# Patient Record
Sex: FEMALE | Race: WHITE | Employment: UNEMPLOYED | URBAN - METROPOLITAN AREA
[De-identification: names, ages, dates, MRNs, and addresses within clinical notes are randomized per-mention and may not be internally consistent; named-entity substitution may affect disease eponyms.]

---

## 2022-09-18 ENCOUNTER — HOSPITAL ENCOUNTER (EMERGENCY)
Age: 32
Discharge: HOME | End: 2022-09-19
Attending: EMERGENCY MEDICINE
Payer: MEDICAID

## 2022-09-18 DIAGNOSIS — F19.94 SUBSTANCE INDUCED MOOD DISORDER (HCC): Primary | ICD-10-CM

## 2022-09-18 DIAGNOSIS — F10.929 ACUTE ALCOHOLIC INTOXICATION WITH COMPLICATION (HCC): ICD-10-CM

## 2022-09-18 LAB
AMPHETAMINE SCREEN, URINE: NORMAL
BACTERIA: NORMAL /HPF
BARBITURATE SCREEN URINE: NORMAL
BENZODIAZEPINE SCREEN, URINE: NORMAL
BILIRUBIN URINE: NEGATIVE
BLOOD, URINE: NEGATIVE
CANNABINOID SCREEN URINE: NORMAL
CLARITY: CLEAR
COCAINE METABOLITE SCREEN URINE: NORMAL
COLOR: YELLOW
EPITHELIAL CELLS, UA: 1 /HPF (ref 0–5)
FENTANYL SCREEN, URINE: NORMAL
GLUCOSE URINE: NEGATIVE MG/DL
HYALINE CASTS: 0 /LPF (ref 0–8)
KETONES, URINE: NEGATIVE MG/DL
LEUKOCYTE ESTERASE, URINE: ABNORMAL
Lab: NORMAL
METHADONE SCREEN, URINE: NORMAL
MICROSCOPIC EXAMINATION: YES
NITRITE, URINE: NEGATIVE
OPIATE SCREEN URINE: NORMAL
OXYCODONE URINE: NORMAL
PH UA: 5.5
PH UA: 5.5 (ref 5–8)
PHENCYCLIDINE SCREEN URINE: NORMAL
PROTEIN UA: NEGATIVE MG/DL
RBC UA: 0 /HPF (ref 0–4)
SPECIFIC GRAVITY UA: 1.01 (ref 1–1.03)
URINE REFLEX TO CULTURE: ABNORMAL
URINE TYPE: ABNORMAL
UROBILINOGEN, URINE: 0.2 E.U./DL
WBC UA: 3 /HPF (ref 0–5)

## 2022-09-18 PROCEDURE — 84703 CHORIONIC GONADOTROPIN ASSAY: CPT

## 2022-09-18 PROCEDURE — 82077 ASSAY SPEC XCP UR&BREATH IA: CPT

## 2022-09-18 PROCEDURE — 36415 COLL VENOUS BLD VENIPUNCTURE: CPT

## 2022-09-18 PROCEDURE — 80179 DRUG ASSAY SALICYLATE: CPT

## 2022-09-18 PROCEDURE — 80307 DRUG TEST PRSMV CHEM ANLYZR: CPT

## 2022-09-18 PROCEDURE — 6370000000 HC RX 637 (ALT 250 FOR IP)

## 2022-09-18 PROCEDURE — 80053 COMPREHEN METABOLIC PANEL: CPT

## 2022-09-18 PROCEDURE — 99285 EMERGENCY DEPT VISIT HI MDM: CPT

## 2022-09-18 PROCEDURE — 81001 URINALYSIS AUTO W/SCOPE: CPT

## 2022-09-18 PROCEDURE — 80143 DRUG ASSAY ACETAMINOPHEN: CPT

## 2022-09-18 RX ORDER — NICOTINE 21 MG/24HR
PATCH, TRANSDERMAL 24 HOURS TRANSDERMAL
Status: DISCONTINUED
Start: 2022-09-18 | End: 2022-09-19 | Stop reason: HOSPADM

## 2022-09-18 RX ORDER — NICOTINE 21 MG/24HR
1 PATCH, TRANSDERMAL 24 HOURS TRANSDERMAL DAILY
Status: DISCONTINUED | OUTPATIENT
Start: 2022-09-19 | End: 2022-09-19 | Stop reason: HOSPADM

## 2022-09-18 ASSESSMENT — LIFESTYLE VARIABLES
HOW OFTEN DO YOU HAVE A DRINK CONTAINING ALCOHOL: 2-3 TIMES A WEEK
HOW MANY STANDARD DRINKS CONTAINING ALCOHOL DO YOU HAVE ON A TYPICAL DAY: 3 OR 4

## 2022-09-18 ASSESSMENT — ENCOUNTER SYMPTOMS
DIARRHEA: 0
VOMITING: 0
SHORTNESS OF BREATH: 0
CHEST TIGHTNESS: 0
ABDOMINAL PAIN: 0
NAUSEA: 0

## 2022-09-18 ASSESSMENT — PAIN - FUNCTIONAL ASSESSMENT: PAIN_FUNCTIONAL_ASSESSMENT: NONE - DENIES PAIN

## 2022-09-19 VITALS
BODY MASS INDEX: 23.05 KG/M2 | DIASTOLIC BLOOD PRESSURE: 78 MMHG | HEART RATE: 78 BPM | HEIGHT: 64 IN | RESPIRATION RATE: 18 BRPM | TEMPERATURE: 98.6 F | WEIGHT: 135 LBS | SYSTOLIC BLOOD PRESSURE: 118 MMHG | OXYGEN SATURATION: 98 %

## 2022-09-19 LAB
A/G RATIO: 1.3 (ref 1.1–2.2)
ACETAMINOPHEN LEVEL: <5 UG/ML (ref 10–30)
ALBUMIN SERPL-MCNC: 4.1 G/DL (ref 3.4–5)
ALP BLD-CCNC: 121 U/L (ref 40–129)
ALT SERPL-CCNC: 220 U/L (ref 10–40)
ANION GAP SERPL CALCULATED.3IONS-SCNC: 14 MMOL/L (ref 3–16)
AST SERPL-CCNC: 214 U/L (ref 15–37)
BASOPHILS ABSOLUTE: 0 K/UL (ref 0–0.2)
BASOPHILS RELATIVE PERCENT: 0.4 %
BILIRUB SERPL-MCNC: 0.4 MG/DL (ref 0–1)
BUN BLDV-MCNC: 12 MG/DL (ref 7–20)
CALCIUM SERPL-MCNC: 8.3 MG/DL (ref 8.3–10.6)
CHLORIDE BLD-SCNC: 110 MMOL/L (ref 99–110)
CO2: 18 MMOL/L (ref 21–32)
CREAT SERPL-MCNC: 0.6 MG/DL (ref 0.6–1.1)
EOSINOPHILS ABSOLUTE: 0.1 K/UL (ref 0–0.6)
EOSINOPHILS RELATIVE PERCENT: 2 %
ETHANOL: 233 MG/DL (ref 0–0.08)
ETHANOL: 60 MG/DL (ref 0–0.08)
GFR AFRICAN AMERICAN: >60
GFR NON-AFRICAN AMERICAN: >60
GLUCOSE BLD-MCNC: 95 MG/DL (ref 70–99)
HCG QUALITATIVE: NEGATIVE
HCT VFR BLD CALC: 42.6 % (ref 36–48)
HEMOGLOBIN: 13.6 G/DL (ref 12–16)
LYMPHOCYTES ABSOLUTE: 1.4 K/UL (ref 1–5.1)
LYMPHOCYTES RELATIVE PERCENT: 23.4 %
MCH RBC QN AUTO: 32.6 PG (ref 26–34)
MCHC RBC AUTO-ENTMCNC: 31.8 G/DL (ref 31–36)
MCV RBC AUTO: 102.5 FL (ref 80–100)
MONOCYTES ABSOLUTE: 0.4 K/UL (ref 0–1.3)
MONOCYTES RELATIVE PERCENT: 6.9 %
NEUTROPHILS ABSOLUTE: 4.1 K/UL (ref 1.7–7.7)
NEUTROPHILS RELATIVE PERCENT: 67.3 %
PDW BLD-RTO: 15.8 % (ref 12.4–15.4)
PLATELET # BLD: 89 K/UL (ref 135–450)
PLATELET SLIDE REVIEW: ABNORMAL
PMV BLD AUTO: 9.1 FL (ref 5–10.5)
POTASSIUM SERPL-SCNC: 4.1 MMOL/L (ref 3.5–5.1)
RBC # BLD: 4.16 M/UL (ref 4–5.2)
REASON FOR REJECTION: NORMAL
REJECTED TEST: NORMAL
SALICYLATE, SERUM: <0.3 MG/DL (ref 15–30)
SARS-COV-2, NAAT: NOT DETECTED
SLIDE REVIEW: ABNORMAL
SODIUM BLD-SCNC: 142 MMOL/L (ref 136–145)
TOTAL PROTEIN: 7.3 G/DL (ref 6.4–8.2)
WBC # BLD: 6.1 K/UL (ref 4–11)

## 2022-09-19 PROCEDURE — 87635 SARS-COV-2 COVID-19 AMP PRB: CPT

## 2022-09-19 PROCEDURE — 96372 THER/PROPH/DIAG INJ SC/IM: CPT

## 2022-09-19 PROCEDURE — 6360000002 HC RX W HCPCS: Performed by: EMERGENCY MEDICINE

## 2022-09-19 PROCEDURE — 6360000002 HC RX W HCPCS

## 2022-09-19 PROCEDURE — 82077 ASSAY SPEC XCP UR&BREATH IA: CPT

## 2022-09-19 PROCEDURE — 85025 COMPLETE CBC W/AUTO DIFF WBC: CPT

## 2022-09-19 PROCEDURE — 36415 COLL VENOUS BLD VENIPUNCTURE: CPT

## 2022-09-19 RX ORDER — MIDAZOLAM HYDROCHLORIDE 2 MG/2ML
2 INJECTION, SOLUTION INTRAMUSCULAR; INTRAVENOUS ONCE
Status: COMPLETED | OUTPATIENT
Start: 2022-09-19 | End: 2022-09-19

## 2022-09-19 RX ORDER — MIDAZOLAM HYDROCHLORIDE 1 MG/ML
INJECTION INTRAMUSCULAR; INTRAVENOUS
Status: COMPLETED
Start: 2022-09-19 | End: 2022-09-19

## 2022-09-19 RX ORDER — HALOPERIDOL 5 MG/ML
5 INJECTION INTRAMUSCULAR ONCE
Status: COMPLETED | OUTPATIENT
Start: 2022-09-19 | End: 2022-09-19

## 2022-09-19 RX ORDER — HALOPERIDOL 5 MG/ML
INJECTION INTRAMUSCULAR
Status: COMPLETED
Start: 2022-09-19 | End: 2022-09-19

## 2022-09-19 RX ORDER — DIPHENHYDRAMINE HYDROCHLORIDE 50 MG/ML
50 INJECTION INTRAMUSCULAR; INTRAVENOUS ONCE
Status: COMPLETED | OUTPATIENT
Start: 2022-09-19 | End: 2022-09-19

## 2022-09-19 RX ADMIN — MIDAZOLAM HYDROCHLORIDE 2 MG: 2 INJECTION, SOLUTION INTRAMUSCULAR; INTRAVENOUS at 00:27

## 2022-09-19 RX ADMIN — HALOPERIDOL LACTATE 5 MG: 5 INJECTION INTRAMUSCULAR at 00:26

## 2022-09-19 RX ADMIN — HALOPERIDOL 5 MG: 5 INJECTION INTRAMUSCULAR at 00:26

## 2022-09-19 RX ADMIN — MIDAZOLAM 2 MG: 1 INJECTION INTRAMUSCULAR; INTRAVENOUS at 00:27

## 2022-09-19 RX ADMIN — DIPHENHYDRAMINE HYDROCHLORIDE 50 MG: 50 INJECTION, SOLUTION INTRAMUSCULAR; INTRAVENOUS at 00:26

## 2022-09-19 ASSESSMENT — PAIN - FUNCTIONAL ASSESSMENT: PAIN_FUNCTIONAL_ASSESSMENT: NONE - DENIES PAIN

## 2022-09-19 NOTE — ED NOTES
Security brought patients things so that she could get numbers to get a ride from hospital since she has been discharged     Jorge Beltrán, RN  09/19/22 5976

## 2022-09-19 NOTE — ED NOTES
Per night shift charge RN violent restraints were discontinued at [phone] this am.     Porfirio Rey RN  09/19/22 3095

## 2022-09-19 NOTE — ED PROVIDER NOTES
[facility]    Physician Attestation    Pt Name: French Dowling  MRN: [id number]  [place] 1990  Date of evaluation: 9/18/22        Physician Note:    I havepersonally performed and/or participated in the history, exam and medical decision making and agree with all pertinent clinical information. I have also reviewed and agree with the past medical, family and social historyunless otherwise noted. I have personally performed a face to face diagnostic evaluation onthis patient. I have reviewed the mid-levels findings and agree. 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 does drink alcohol but not on a daily basis is feeling suicidal he has access to a gun and has thought of shooting her self      REVIEW OF SYSTEMS  \  Constitutional:  Denies fever, chills, or weakness   Eyes:  Denies vision changes  HENT:  Denies sore throat or neck pain   Respiratory:  Denies cough or shortness of breath   Cardiovascular:  Denies chest pain  GI:  Denies abdominal pain, nausea, vomiting, or diarrhea   Musculoskeletal:  Denies back pain   Skin: no rash or vesicles   Neurologic:  no headache weakness focal    Lymphatic:  no swollen  nodes   Psychiatric: no si or hs thoughts     All systems negative except as marked. General Appearance:  Alert, cooperative, no distress, appears stated age. Head:  Normocephalic, without obvious abnormality, atraumatic. Eyes:  conjunctiva/corneas clear, EOM's intact. Sclera anicteric. ENT: Mucous membranes moist.   Neck: Supple, symmetrical, trachea midline, no adenopathy. No jugular venous distention. Lungs:   No Respiratory Distress. no rales  rhonchi rub   Chest Wall:  Nontender  no deformity   Heart:  Rsr no murmer gallop    Abdomen:   Soft nontender no organomegally    Extremities:  Full range of motion. no deformity   Pulses: Equal  upper and lower    Skin:  No rashes or lesions to exposed skin. Neurologic: Alert and oriented X 3. Motor grossly normal.  Speech clear. Cr n 2-12 intact   Labs are reviewed and the patient is medically cleared for transfer to the psychiatric facility      1. Acute alcoholic intoxication without complication ([facility] [address]    2. Suicidal ideation          DISPOSITION/PLAN  PATIENT REFERRED TO:  No follow-up provider specified. DISCHARGE MEDICATIONS:  New Prescriptions    No medications on file         Is this patient to be included in the SEP-1 Core Measure due to severe sepsis or septic shock? No   Exclusion criteria - the patient is NOT to be included for SEP-1 Core Measure due to:   Infection is not suspected      MD Isaias Enriquez MD  09/19/22 5347

## 2022-09-19 NOTE — ED PROVIDER NOTES
[facility]        Pt Name: Reyes Tolliver  MRN: [id number]  [place] 1990  Date of evaluation: 9/18/2022  Provider: JOSE DAVID Rizo CNP  PCP: No primary care provider on file. Note Started: 11:23 PM EDT        I have seen and evaluated this patient with my supervising physician Anjana Gillis MD.    [address]       Chief Complaint   Patient presents with    Suicidal     PT states she is from out of town, has been having some emotional problems lately and is having suicidal ideation with a plan. Denies any hx of self harm. HISTORY OF PRESENT ILLNESS   (Location, Timing/Onset, Context/Setting, Quality, Duration, Modifying Factors, Severity, Associated Signs and Symptoms)  Note limiting factors. Chief Complaint: suicidal ideation     Reyes Tolliver is a 34 y.o. female who presents to the emergency department with complaint of suicidal ideation. The patient reports that she plans to kill her self with a gun. States that she does not have a gun but does have access to a gun. Reports history of substance abuse but states that she has been [place] and sober for 3 months\". She is elusive with history, states that she is from [place] but lives in [facility]. Denies history of hospitalization for mental health reasons. Denies history of mental health diagnosis or prescribed medications. Nursing Notes were all reviewed and agreed with or any disagreements were addressed in the HPI. REVIEW OF SYSTEMS    (2-9 systems for level 4, 10 or more for level 5)     Review of Systems   Constitutional:  Negative for activity change, chills and fever. Respiratory:  Negative for chest tightness and shortness of breath. Cardiovascular:  Negative for chest pain. Gastrointestinal:  Negative for abdominal pain, diarrhea, nausea and vomiting. Genitourinary:  Negative for dysuria.    Psychiatric/Behavioral:  Positive for suicidal ideas. All other systems reviewed and are negative. Positives and Pertinent negatives as per HPI. Except as noted above in the ROS, all other systems were reviewed and negative. PAST MEDICAL HISTORY   History reviewed. No pertinent past medical history. SURGICAL HISTORY   History reviewed. No pertinent surgical history. CURRENTMEDICATIONS       There are no discharge medications for this patient. ALLERGIES     Patient has no known allergies. FAMILYHISTORY     History reviewed. No pertinent family history. SOCIAL HISTORY       Social History     Tobacco Use    Smoking status: Every Day     Types: Cigarettes   Substance Use Topics    Alcohol use: Yes     Comment: \"more than one a day\"    Drug use: Not Currently     Types: Opiates      Comment: \"I've been clean and sober for 3 months\"       SCREENINGS    Oley Coma Scale  Eye Opening: Spontaneous  Best Verbal Response: Oriented  Best Motor Response: Obeys commands  Larry Coma Scale Score: 15        PHYSICAL EXAM    (up to 7 for level 4, 8 or more for level 5)     ED Triage Vitals [09/18/22 2242]   BP Temp Temp Source Heart Rate Resp SpO2 Height Weight   119/77 98.6 °F (37 °C) Oral 95 16 99 % 5' 4\" (1.626 m) 135 lb (61.2 kg)       Physical Exam  Vitals and nursing note reviewed. Constitutional:       Appearance: She is well-developed. She is not diaphoretic. HENT:      Head: Normocephalic and atraumatic. Right Ear: External ear normal.      Left Ear: External ear normal.   Eyes:      General:         Right eye: No discharge. Left eye: No discharge. Neck:      Vascular: No JVD. Cardiovascular:      Rate and Rhythm: Normal rate. Pulses: Normal pulses. Heart sounds: Normal heart sounds. Pulmonary:      Effort: Pulmonary effort is normal. No respiratory distress. Breath sounds: Normal breath sounds. Abdominal:      Palpations: Abdomen is soft.    Musculoskeletal:         General: Normal range of motion. Skin:     General: Skin is warm and dry. Coloration: Skin is not pale. Neurological:      Mental Status: She is alert. Psychiatric:         Attention and Perception: She is inattentive. Speech: Speech is delayed. Behavior: Behavior is withdrawn. Thought Content: Thought content includes suicidal ideation. Thought content includes suicidal plan.        DIAGNOSTIC RESULTS   LABS:    Labs Reviewed   COMPREHENSIVE METABOLIC PANEL - Abnormal; Notable for the following components:       Result Value    CO2 18 (*)      (*)      (*)     All other components within normal limits    Narrative:     CALL  [facility] tel. [phone],  Rejected Test Name/Called to: TOÑO Veloz, 09/19/2022 00:00, by [address] [place] [address] TO CULTURE - Abnormal; Notable for the following components:    Leukocyte Esterase, Urine TRACE (*)     All other components within normal limits   ACETAMINOPHEN LEVEL - Abnormal; Notable for the following components:    Acetaminophen Level <5 (*)     All other components within normal limits    Narrative:     [facility] tel. [phone],  Rejected Test Name/Called to: TOÑO Veloz, 09/19/2022 08:65, by [facility]   SALICYLATE LEVEL - Abnormal; Notable for the following components:    Salicylate, Serum <1.8 (*)     All other components within normal limits    Narrative:     [facility] tel. [phone],  Rejected Test Name/Called to: TOÑO Veloz, 09/19/2022 00:00, by [facility]   CBC WITH AUTO DIFFERENTIAL - Abnormal; Notable for the following components:    .5 (*)     RDW 15.8 (*)     Platelets 89 (*)     All other components within normal limits   COVID-19, RAPID   ETHANOL    Narrative:     [facility] tel. [phone],  Rejected Test Name/Called to: TOÑO Veloz, 09/19/2022 00:00, by [facility]   URINE DRUG SCREEN   HCG, SERUM, QUALITATIVE    Narrative:     [facility] tel. [phone],  Rejected Test Name/Called to: TOÑO Patel, 09/19/2022 00:00, by ALEXUS RIDER JR. [facility]   MICROSCOPIC URINALYSIS   SPECIMEN REJECTION    Narrative:     CALL  [facility] tel. [phone],  Rejected Test Name/Called to: TOÑO Ptael, 09/19/2022 00:00, by ALEXUS RIDER JR. [facility]   ETHANOL       When ordered only abnormal lab results are displayed. All other labs were within normal range or not returned as of this dictation. EKG: When ordered, EKG's are interpreted by the Emergency Department Physician in the absence of a cardiologist.  Please see their note for interpretation of EKG. RADIOLOGY:   Non-plain film images such as CT, Ultrasound and MRI are read by the radiologist. Plain radiographic images are visualized and preliminarily interpreted by the ED Provider with the below findings:        Interpretation per the Radiologist below, if available at the time of this note:    No orders to display     No results found. PROCEDURES   Unless otherwise noted below, none     Procedures    CRITICAL CARE TIME       CONSULTS:  None      EMERGENCY DEPARTMENT COURSE and DIFFERENTIAL DIAGNOSIS/MDM:   Vitals:    Vitals:    09/18/22 2242 09/19/22 0623   BP: 119/77 118/78   Pulse: 95 78   Resp: 16 18   Temp: 98.6 °F (37 °C)    TempSrc: Oral    SpO2: 99% 98%   Weight: 135 lb (61.2 kg)    Height: 5' 4\" (1.626 m)        Patient was given the following medications:  Medications   haloperidol lactate (HALDOL) injection 5 mg (5 mg IntraMUSCular Given 9/19/22 0026)   diphenhydrAMINE (BENADRYL) injection 50 mg (50 mg IntraMUSCular Given 9/19/22 0026)   midazolam PF (VERSED) injection 2 mg (2 mg IntraMUSCular Given 9/19/22 0027)         Is this patient to be included in the SEP-1 Core Measure due to severe sepsis or septic shock? No   Exclusion criteria - the patient is NOT to be included for SEP-1 Core Measure due to:   Infection is not suspected    Briefly, this is a 18-year-old that is never been seen at this facility previously here tonight complaining of suicidal ideation with plan. Patient elusive with history with interview. 0030 patient became aggressive with staff, verbally and physically with staff when approached for lab re-draw due to specimens being hemolyzed. Patient lunged at staff and became threatening. She was given medications per order and placed in 4-point hard restraints for safety of patient and staff. Patient's ethanol level is elevated, this will be rechecked at 5:00 AM.  Patient will be reinterviewed at time of sobriety. Her visit does exceed the length of my shift, please see attending physician statement    FINAL IMPRESSION      1. Substance induced mood disorder ([facility] [address]    2. Acute alcoholic intoxication with complication [facility])          DISPOSITION/PLAN   DISPOSITION Decision To Discharge 09/19/2022 10:31:45 AM      PATIENT REFERRED TO:  [facility]  [phone]    Schedule an appointment as soon as possible for a visit in 1 day      [address]:  There are no discharge medications for this patient. DISCONTINUED MEDICATIONS:  There are no discharge medications for this patient.              (Please note that portions of this note were completed with a voice recognition program.  Efforts were made to edit the dictations but occasionally words are mis-transcribed.)    JOSE DAVID Chew CNP (electronically signed)            JOSE DAVID Chew CNP  09/19/22 [address] JOSE DAVID Mora CNP  09/19/22 7098

## 2022-09-19 NOTE — ED PROVIDER NOTES
10:28 AM: I discussed the history, physical examination, laboratory and imaging studies, and treatment plan with Dr. Earlene Turk. Kim Roberts was signed out to me in stable condition. Please see Dr. Jens Wright documentation for details of their history, physical, and laboratory studies. Upon re-examination, a summary of Soham Isidro's history, physical examination, and studies are as follows:    I was asked to reevaluate the patient and when I evaluated her she is now both clinically and chemically sober and states she does not want to hurt her self. She reports that she was drinking last night and started to think about killing herself by shooting herself with a gun. She does not own a gun or have access to a gun. She is never attempted suicide in the past.  She is currently in sober living and cannot return there for 2 days because she drank last night. She denies any auditory or visual hallucinations. No homicidal thoughts. On my exam heart is regular rate and rhythm and abdomen benign. In my opinion this patient is in no danger to herself and can be safely discharged. I filled out the statement of observation and invalidated her psychiatric hold.     No orders to display     Labs Reviewed   COMPREHENSIVE METABOLIC PANEL - Abnormal; Notable for the following components:       Result Value    CO2 18 (*)      (*)      (*)     All other components within normal limits    Narrative:     CALL  [facility] tel. [phone],  Rejected Test Name/Called to: RN Leesa Lombard, 09/19/2022 00:00, by [address] [place] [address] TO CULTURE - Abnormal; Notable for the following components:    Leukocyte Esterase, Urine TRACE (*)     All other components within normal limits   ACETAMINOPHEN LEVEL - Abnormal; Notable for the following components:    Acetaminophen Level <5 (*)     All other components within normal limits    Narrative:     CALL  [facility] tel. [phone],  Rejected Test Name/Called to: TOÑO Pacheco john, 09/19/2022 53:49, by ALEXUS RIDER JR. [facility]   SALICYLATE LEVEL - Abnormal; Notable for the following components:    Salicylate, Serum <9.6 (*)     All other components within normal limits    Narrative:     CALL  [facility] tel. [phone],  Rejected Test Name/Called to: TOÑO Veloz, 09/19/2022 00:00, by ALEXUS RIDER JR. [facility]   CBC WITH AUTO DIFFERENTIAL - Abnormal; Notable for the following components:    .5 (*)     RDW 15.8 (*)     Platelets 89 (*)     All other components within normal limits   COVID-19, RAPID   ETHANOL    Narrative:     CALL  [facility] tel. [phone],  Rejected Test Name/Called to: TOÑO Veloz, 09/19/2022 00:00, by [address]   HCG, SERUM, QUALITATIVE    Narrative:     Rebekah Machuca  [facility] tel. [phone],  Rejected Test Name/Called to: TOÑO Veloz, 09/19/2022 00:00, by [address]    Narrative:     Rebekah Machuca  [facility] tel. [phone],  Rejected Test Name/Called to: TOÑO Veloz, 09/19/2022 00:00, by GIUSEPPE RIDER JR. [facility]   ETHANOL         I estimate there is LOW risk for ACUTE CORONARY SYNDROME, INTRACRANIAL HEMORRHAGE, MALIGNANT DYSRHYTHMIA, MENINGITIS, PNEUMONIA, PULMONARY EMBOLISM, SEPSIS, SUBARACHNOID HEMORRHAGE, SUBDURAL HEMATOMA, STROKE, or URINARY TRACT INFECTION, thus I consider the discharge disposition reasonable. Aminta Dior and I have discussed the diagnosis and risks, and we agree with discharging home to follow-up with their primary doctor. We also discussed returning to the Emergency Department immediately if new or worsening symptoms occur. We have discussed the symptoms which are most concerning (e.g., changing or worsening pain, weakness, vomiting, fever) that necessitate immediate return. Final Impression    1. Substance induced mood disorder ([facility] [address]    2. Acute alcoholic intoxication with complication (HCC)        Blood pressure 118/78, pulse 78, temperature 98.6 °F (37 °C), temperature source Oral, resp.  rate 18, height 5' 4\" (1.626 m), weight 135 lb (61.2 kg), SpO2 98 %.          Rossy Moses MD  09/19/22 6583

## 2022-09-19 NOTE — ED NOTES
Pt did not wait for any of her paperwork and left without any paperwork and \"UBERED\" out of here very quickly. Pt was told to please wait on her paperwork for her discharge.      Marie Cordero RN  09/19/22 1128